# Patient Record
Sex: MALE | Race: WHITE | NOT HISPANIC OR LATINO
[De-identification: names, ages, dates, MRNs, and addresses within clinical notes are randomized per-mention and may not be internally consistent; named-entity substitution may affect disease eponyms.]

---
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
## 2023-11-30 ENCOUNTER — NON-APPOINTMENT (OUTPATIENT)
Age: 23
End: 2023-11-30

## 2023-11-30 ENCOUNTER — APPOINTMENT (OUTPATIENT)
Dept: OPHTHALMOLOGY | Facility: CLINIC | Age: 23
End: 2023-11-30
Payer: COMMERCIAL

## 2023-11-30 PROCEDURE — 92004 COMPRE OPH EXAM NEW PT 1/>: CPT

## 2024-11-13 ENCOUNTER — EMERGENCY (EMERGENCY)
Facility: HOSPITAL | Age: 24
LOS: 1 days | Discharge: HOME | End: 2024-11-13
Attending: STUDENT IN AN ORGANIZED HEALTH CARE EDUCATION/TRAINING PROGRAM | Admitting: STUDENT IN AN ORGANIZED HEALTH CARE EDUCATION/TRAINING PROGRAM
Payer: COMMERCIAL

## 2024-11-13 VITALS
TEMPERATURE: 97 F | SYSTOLIC BLOOD PRESSURE: 115 MMHG | HEART RATE: 74 BPM | DIASTOLIC BLOOD PRESSURE: 79 MMHG | RESPIRATION RATE: 16 BRPM | OXYGEN SATURATION: 98 %

## 2024-11-13 PROCEDURE — 99053 MED SERV 10PM-8AM 24 HR FAC: CPT

## 2024-11-13 PROCEDURE — 99283 EMERGENCY DEPT VISIT LOW MDM: CPT

## 2025-01-06 ENCOUNTER — EMERGENCY (EMERGENCY)
Facility: HOSPITAL | Age: 25
LOS: 1 days | Discharge: NURSING, REHAB OR LONG-TERM CARE | End: 2025-01-06
Attending: EMERGENCY MEDICINE
Payer: MEDICARE

## 2025-01-06 VITALS
WEIGHT: 175.05 LBS | HEIGHT: 71 IN | HEART RATE: 77 BPM | RESPIRATION RATE: 16 BRPM | OXYGEN SATURATION: 100 % | SYSTOLIC BLOOD PRESSURE: 115 MMHG | TEMPERATURE: 98 F | DIASTOLIC BLOOD PRESSURE: 75 MMHG

## 2025-01-06 VITALS
OXYGEN SATURATION: 99 % | RESPIRATION RATE: 18 BRPM | SYSTOLIC BLOOD PRESSURE: 105 MMHG | DIASTOLIC BLOOD PRESSURE: 68 MMHG | HEART RATE: 65 BPM | TEMPERATURE: 98 F

## 2025-01-06 NOTE — CHART NOTE - NSCHARTNOTEFT_GEN_A_CORE
ED SW-    LMSW consulted by ED MD for assistance regarding patient returning/discharging to group home . Per chart, patient is a 27 y/o M pmhx of autism spectrum disorder presenting with staff at bedside for epistaxis. Per ED MD, staff member unable to recall exact building name but confirmed address. Per ED MD, patient medically cleared for discharge and non emergent has been completed.    LMSW reviewed patient's chart and discovered number of group home coordinator Jean-Claude Egan [phone]. LMSW contacted Ms. Jean-Claude Egan to introduce self, role, and to confirm patient's residence. Jean-Claude confirmed address on chart, and patient's building is [facility] [address] [place]. Jean-Claude requested ED MD to contact her to provide handoff and that ambulance transport can be arranged. LMSW provided ED MD with Jean-Claude's phone number to provide hand-off. ED registration confirmed non emergent received and ambulance will be arranged. LMSW met with staff member and patient at bedside to inform of trip arrangement- no questions further expressed. No further SW needs. SW to remain available.

## 2025-01-06 NOTE — ED PROVIDER NOTE - PATIENT PORTAL LINK FT
You can access the FollowMyHealth Patient Portal offered by [facility] by registering at the following website: [email or web address]. By joining [facility]’s FollowMyHealth portal, you will also be able to view your health information using other applications (apps) compatible with our system.

## 2025-01-06 NOTE — ED ADULT NURSE NOTE - NSFALLRISKINTERV_ED_ALL_ED

## 2025-01-06 NOTE — ED PROVIDER NOTE - PHYSICAL EXAMINATION
Gen: No acute distress  HEENT: EOMI, no nasal discharge, no epistaxis, septal defect noted. No tonsillar swelling or bleeding gums or posterior throat bleeding or blood.   CV: RRR, +S1/S2, no M/R/G, 2+ radial pulses b/l  Resp: CTAB, no W/R/R, no accessory muscle use, no increased work of breathing  GI: Abdomen soft non-distended, NTTP  MSK: No open wounds, no bruising, no LE edema  Neuro: non verbal at baseline, following commands, moving all four extremities spontaneously  Psych: appropriate mood

## 2025-01-06 NOTE — ED ADULT NURSE NOTE - NSSUHOSCREENINGYN_ED_ALL_ED
Green (Altered Mental Status/Behavior) No - the patient is unable to be screened due to medical condition

## 2025-01-06 NOTE — ED PROVIDER NOTE - CLINICAL SUMMARY MEDICAL DECISION MAKING FREE TEXT BOX
Attending note.  Patient was seen in room #55.  Patient reports becoming ill 3 days ago with nasal congestion and cough and went flying back from [place] 2 days ago reports having 2 syncopal events and having vomiting.  Patient does not recall sequence of the events.  She denies having chest pain or palpitations.  She denies any nausea vomiting or diarrhea currently.  She continues to complain of nasal congestion and cough productive of phlegm.  She has a history of asthma with prior intubations.  Last steroid use was 1 year ago.  She did not get flu vaccine and reports allergy to the vaccine.  ROS-as above, otherwise negative.  PE-patient is alert in no acute distress.  Skin is warm and dry.  Lungs are clear and equal bilaterally.  There are no wheezes, rales or rhonchi.  Heart is regular rate and rhythm.  Abdomen is obese, soft and nontender.  There is no guarding or rebound.  There is no extremity tenderness.  Neurologic examination is grossly intact.     A/P-patient with recent nasal and chest congestion with cough and reported 2 syncopal events when flying back from [place] 2 days ago.  Today he has no previous history of cardiac disease.  Likely viral etiology.  EKG, troponin, chest x-ray, nasal viral swabs, labs and reassess.  Symptomatic care. Attending note.  Patient was seen in room #53.  Patient has history of autism and came from group home after having 23 minutes of epistaxis which resolved prior to patient's arrival.  Staff states patient has prior history of epistaxis.  ROS-as above, otherwise negative.  PE-patient is alert no acute distress.  There is no evidence of active bleeding or previous epistaxis.  There is septal perforation.  There is no blood in the posterior pharynx.  A/P-epistaxis with prior history which resolved prior to arrival.  Would recommend nasal saline or humidifier.  Return to emergency department if epistaxis recurs and is not controlled by compression for 20 minutes.

## 2025-01-06 NOTE — ED PROVIDER NOTE - NSFOLLOWUPINSTRUCTIONS_ED_ALL_ED_FT
You were seen and evaluated here in the emergency department due to your nosebleed.  Urine alcohol intermittently bleeding here in the emergency department.  If symptoms do return you should attempt to stop the bleeding at home but if bleeding continues acute pain come back to the emergency department.      You most likely are dealing with the change in season causing dryness to the nose and bleeding.  You can use nasal saline sprays to help moisten the nose.  You can also use humidifiers to help moisten the air to decrease risk of nosebleeds.    Please return to the emergency department if you have any of the following symptoms.  These symptoms include or not limited to more frequent nosebleeds, inability to stop a nosebleed, GI bleeding, blood in your urine, headache, fever, chest pain, shortness of breath.    Please follow-up with your primary care provider within the next week to further discuss what you are seen for here in the emergency department.

## 2025-01-06 NOTE — ED PROVIDER NOTE - OBJECTIVE STATEMENT
26-year-old male with past medical history of autism spectrum disorder presents the emergency department due to epistaxis.  Epistaxis started approximately 630 and ended at approximately 6:50 AM.  Has had 1 prior episode of epistaxis within the year.  Brought in by staff member from facility he is living.  Unsure of exact facility at this time.  Staff member states no other issues aside from just a nosebleed.  Not currently bleeding stopped on its own.  Denies any other symptoms of bleeding.